# Patient Record
(demographics unavailable — no encounter records)

---

## 2025-01-29 NOTE — HISTORY OF PRESENT ILLNESS
[FreeTextEntry1] : Sonya is a 27 day old girl who was prenatally diagnosed with an ovarian cyst here today for initial evaluation.   She was born at 37 weeks without any concerns.  She had an ultrasound after birth that demonstrated a cyst in the left adnexa measuring 4.5 x 3.8 x 4.5 cm without definitive ovarian tissue identified.  The contralateral ovary was not visualized.  She was discharged home with mom and dad without any symptoms.  Since discharge, she has been doing well. She is tolerating feeds without emesis.  She is having normal wet diapers and normal bowel movements.  She had a follow up ultrasound today and they are here to discuss the results.

## 2025-01-29 NOTE — ADDENDUM
[FreeTextEntry1] : Documented by León Haddad acting as a scribe for Dr. Paredes on 01/27/2025.   All medical record entries made by the Scribe were at my, Dr. Paredes, direction and personally dictated by me on 01/27/2025. I have reviewed the chart and agree that the record accurately reflects my personal performances of the history, physical exam, assessment and plan. I have also personally directed, reviewed, and agree with the instructions.

## 2025-01-29 NOTE — ASSESSMENT
[FreeTextEntry1] : Sonya is a 27 day old girl with a prenatally diagnosed pelvic cyst.  She has been doing well and remains asymptomatic at this time. She had a follow-up ultrasound today that I reviewed with Dr Moa of pediatric radiology and then with mom and dad. It demonstrated a decrease in size of the lesion, which initially measured 4.5 cm in greatest dimension, now measures 1.9 x 1.8 x 0.9 cm; the right ovary is not visualized again on this study.  I discussed these findings with mom and dad and spent time educating them about prenatal ovarian cysts and the implications of this. I reassured them that the cyst has decreased in size, which is the typical course of these lesions after birth; however, I did review the concept of  torsion and the implications of this.  They understand while today's results are reassuring, we cannot rule this out entirely at this point.  I also reviewed with them that the contralateral ovary has not been visualized, which is most likely due to technical limitations, but I did review the more unlikely possibility that this ovary has undergone  torsion.  They understand that overall, I am pleased with today's results. I reviewed treatment options at this time, and we are in agreement to proceed with a repeat ultrasound in approximately 4 weeks to reassess the cyst. They will follow up with me after the ultrasound to review the results. They know to contact me sooner with any further questions or concerns or should Sonya develop any new or concerning symptoms.

## 2025-01-29 NOTE — CONSULT LETTER
[Dear  ___] : Dear  [unfilled], [Consult Letter:] : I had the pleasure of evaluating your patient, [unfilled]. [Please see my note below.] : Please see my note below. [Consult Closing:] : Thank you very much for allowing me to participate in the care of this patient.  If you have any questions, please do not hesitate to contact me. [Sincerely,] : Sincerely, [FreeTextEntry2] : Dr Janessa Kelley [address] [place] [address] Phone: [phone] [FreeTextEntry3] : Octavio Paredes MD Division of Pediatric, General, Thoracic and Endoscopic Surgery [facility]

## 2025-01-29 NOTE — REASON FOR VISIT
[Initial - Scheduled] : an initial, scheduled visit with concerns of [Other: ____] : [unfilled] [Patient] : patient [Parents] : parents [FreeTextEntry4] : Dr Janessa Kelley

## 2025-01-29 NOTE — CONSULT LETTER
[Dear  ___] : Dear  [unfilled], [Consult Letter:] : I had the pleasure of evaluating your patient, [unfilled]. [Please see my note below.] : Please see my note below. [Consult Closing:] : Thank you very much for allowing me to participate in the care of this patient.  If you have any questions, please do not hesitate to contact me. [Sincerely,] : Sincerely, [FreeTextEntry2] : Dr Janessa Kelley [address] [place] [address] Phone: [phone] [FreeTextEntry3] : Octavio Paredes MD Division of Pediatric, General, Thoracic and Endoscopic Surgery [facility]

## 2025-01-29 NOTE — DATA REVIEWED
[de-identified] :  ACC: [id number] EXAM:  US PELVIC LIMITED OR FOLLOW UP   ORDERED BY: GEE HORTON   PROCEDURE DATE:  01/27/2025      INTERPRETATION:  CLINICAL INFORMATION: Ovarian cyst  COMPARISON: None available.  TECHNIQUE: Transabdominal pelvic sonogram only.  FINDINGS:  Urinary bladder is empty with limited evaluation.  The uterus and right ovary were not visualized.  Left ovary: 2.7 cm x 1.3 cm x 2.9 cm. There are 2 left ovarian cyst  measuring 1.9 x 1.8 x 0.9 cm and 0.9 x 0.9 x 0.9 cm. There is ovarian  tissue at the cyst periphery.  Fluid: None.  IMPRESSION: 2 left ovarian cysts, larger measuring 1.9 x 1.8 x 0.9 cm. Uterus and right ovary were not visualized.  --- End of Report ---      YOGI LEONARDO MD; Attending Radiologist This document has been electronically signed. Jan 27 2025 10:08AM

## 2025-01-29 NOTE — DATA REVIEWED
[de-identified] :  ACC: [id number] EXAM:  US PELVIC LIMITED OR FOLLOW UP   ORDERED BY: GEE HORTON   PROCEDURE DATE:  01/27/2025      INTERPRETATION:  CLINICAL INFORMATION: Ovarian cyst  COMPARISON: None available.  TECHNIQUE: Transabdominal pelvic sonogram only.  FINDINGS:  Urinary bladder is empty with limited evaluation.  The uterus and right ovary were not visualized.  Left ovary: 2.7 cm x 1.3 cm x 2.9 cm. There are 2 left ovarian cyst  measuring 1.9 x 1.8 x 0.9 cm and 0.9 x 0.9 x 0.9 cm. There is ovarian  tissue at the cyst periphery.  Fluid: None.  IMPRESSION: 2 left ovarian cysts, larger measuring 1.9 x 1.8 x 0.9 cm. Uterus and right ovary were not visualized.  --- End of Report ---      YOGI LEONARDO MD; Attending Radiologist This document has been electronically signed. Jan 27 2025 10:08AM

## 2025-01-29 NOTE — ASSESSMENT
[FreeTextEntry1] : Sonya is a 27 day old girl with a prenatally diagnosed pelvic cyst.  She has been doing well and remains asymptomatic at this time. She had a follow-up ultrasound today that I reviewed with Dr Mao of pediatric radiology and then with mom and dad. It demonstrated a decrease in size of the lesion, which initially measured 4.5 cm in greatest dimension, now measures 1.9 x 1.8 x 0.9 cm; the right ovary is not visualized again on this study.  I discussed these findings with mom and dad and spent time educating them about prenatal ovarian cysts and the implications of this. I reassured them that the cyst has decreased in size, which is the typical course of these lesions after birth; however, I did review the concept of  torsion and the implications of this.  They understand while today's results are reassuring, we cannot rule this out entirely at this point.  I also reviewed with them that the contralateral ovary has not been visualized, which is most likely due to technical limitations, but I did review the more unlikely possibility that this ovary has undergone  torsion.  They understand that overall, I am pleased with today's results. I reviewed treatment options at this time, and we are in agreement to proceed with a repeat ultrasound in approximately 4 weeks to reassess the cyst. They will follow up with me after the ultrasound to review the results. They know to contact me sooner with any further questions or concerns or should Sonya develop any new or concerning symptoms.

## 2025-01-29 NOTE — CONSULT LETTER
[Dear  ___] : Dear  [unfilled], [Consult Letter:] : I had the pleasure of evaluating your patient, [unfilled]. [Please see my note below.] : Please see my note below. [Consult Closing:] : Thank you very much for allowing me to participate in the care of this patient.  If you have any questions, please do not hesitate to contact me. [Sincerely,] : Sincerely, [FreeTextEntry2] : Dr Janessa Kelley [address] [place] [address] Phone: [phone] [FreeTextEntry3] : Octavio Paredes MD Division of Pediatric, General, Thoracic and Endoscopic Surgery [facility]

## 2025-01-29 NOTE — DATA REVIEWED
[de-identified] :  ACC: [id number] EXAM:  US PELVIC LIMITED OR FOLLOW UP   ORDERED BY: GEE HORTON   PROCEDURE DATE:  01/27/2025      INTERPRETATION:  CLINICAL INFORMATION: Ovarian cyst  COMPARISON: None available.  TECHNIQUE: Transabdominal pelvic sonogram only.  FINDINGS:  Urinary bladder is empty with limited evaluation.  The uterus and right ovary were not visualized.  Left ovary: 2.7 cm x 1.3 cm x 2.9 cm. There are 2 left ovarian cyst  measuring 1.9 x 1.8 x 0.9 cm and 0.9 x 0.9 x 0.9 cm. There is ovarian  tissue at the cyst periphery.  Fluid: None.  IMPRESSION: 2 left ovarian cysts, larger measuring 1.9 x 1.8 x 0.9 cm. Uterus and right ovary were not visualized.  --- End of Report ---      YOGI LEONARDO MD; Attending Radiologist This document has been electronically signed. Jan 27 2025 10:08AM

## 2025-03-13 NOTE — DATA REVIEWED
[de-identified] : ACC: [id number]     EXAM:  US PELVIC LIMITED OR FOLLOW UP   ORDERED BY: GEE HORTON  PROCEDURE DATE:  03/11/2025    INTERPRETATION:  CLINICAL INFORMATION: Ovarian cyst  COMPARISON: Pelvic ultrasound 1/27/2025  TECHNIQUE: Transabdominal pelvic sonogram only.  FINDINGS: Uterus: 3.2 cm x 0.6 cm x 1.3 cm. Within normal limits.  Right ovary: 1.1 cm x 0.6 cm x 0.8 cm. Within normal limits. Small follicles are noted. No cyst or mass. Left ovary: 1.7 cm x 1.3 cm x 1.0 cm. Within normal limits. A few follicles are noted measuring up to 1.0 cm. No focal mass.  Fluid: None.  IMPRESSION: The ovaries appear unremarkable with multiple follicles. No dominant cyst or mass.  --- End of Report ---      YOGI LEONARDO MD; Attending Radiologist This document has been electronically signed. Mar 11 2025  1:49PM

## 2025-03-13 NOTE — CONSULT LETTER
[Dear  ___] : Dear  [unfilled], [Consult Letter:] : I had the pleasure of evaluating your patient, [unfilled]. [Please see my note below.] : Please see my note below. [Consult Closing:] : Thank you very much for allowing me to participate in the care of this patient.  If you have any questions, please do not hesitate to contact me. [Sincerely,] : Sincerely, [FreeTextEntry2] : Dr Janessa Kelley [address] [place] [address] Phone: [phone] [FreeTextEntry3] :  Octavio Paredes MD  Division of Pediatric, General, Thoracic, and Endoscopic Surgery  [facility]

## 2025-03-13 NOTE — ADDENDUM
[FreeTextEntry1] : Documented by Carie Montez acting as a scribe for Dr. Paredes on 03/11/2025. All medical record entries made by the Scribe were at Dr. Paredes's direction and personally dictated by me on 03/11/2025. I have reviewed the chart and agree that the record accurately reflects my personal performances of the history, physical exam, assessment, and plan. I have also personally directed, reviewed, and agree with the plan.

## 2025-03-13 NOTE — REASON FOR VISIT
[Follow-up - Scheduled] : a follow-up, scheduled visit for [Other: ____] : [unfilled] [Parents] : parents [FreeTextEntry4] : Dr Janessa Kelley [Patient] : patient

## 2025-03-13 NOTE — HISTORY OF PRESENT ILLNESS
[FreeTextEntry1] : Sonya is a 2-month-old girl who is here today to follow up for a prenatally identified ovarian cyst.  Since her last visit, she has continued to do well.  She is tolerating feeds well without emesis.  She is having normal bowel movements twice per day.  She is having normal wet diapers.  She is growing and gaining weight appropriately.  She does not seem to have any pain or discomfort.  She had a follow up ultrasound, and they are here to discuss the results.

## 2025-03-13 NOTE — ASSESSMENT
[FreeTextEntry1] : Sonya is a 2-month-old girl with a history of a prenatally diagnosed ovarian cyst.  She has been doing well and remains asymptomatic. She had an ultrasound today that I reviewed with mom and dad. It demonstrates normal bilateral ovaries without any evidence of residual ovarian cyst or mass. I offered reassurance to mom and dad regarding these findings. I reviewed that the cyst likely spontaneously resolved which is often the course. I do not recommend any further surveillance at this time. Mom and dad know to contact me going forward with any further questions or concerns. Sonya can return to see me on an as needed basis.

## 2025-03-13 NOTE — DATA REVIEWED
[de-identified] : ACC: [id number]     EXAM:  US PELVIC LIMITED OR FOLLOW UP   ORDERED BY: GEE HORTON  PROCEDURE DATE:  03/11/2025    INTERPRETATION:  CLINICAL INFORMATION: Ovarian cyst  COMPARISON: Pelvic ultrasound 1/27/2025  TECHNIQUE: Transabdominal pelvic sonogram only.  FINDINGS: Uterus: 3.2 cm x 0.6 cm x 1.3 cm. Within normal limits.  Right ovary: 1.1 cm x 0.6 cm x 0.8 cm. Within normal limits. Small follicles are noted. No cyst or mass. Left ovary: 1.7 cm x 1.3 cm x 1.0 cm. Within normal limits. A few follicles are noted measuring up to 1.0 cm. No focal mass.  Fluid: None.  IMPRESSION: The ovaries appear unremarkable with multiple follicles. No dominant cyst or mass.  --- End of Report ---      YOGI LEONARDO MD; Attending Radiologist This document has been electronically signed. Mar 11 2025  1:49PM